# Patient Record
Sex: FEMALE | Race: WHITE | Employment: FULL TIME | URBAN - METROPOLITAN AREA
[De-identification: names, ages, dates, MRNs, and addresses within clinical notes are randomized per-mention and may not be internally consistent; named-entity substitution may affect disease eponyms.]

---

## 2017-02-10 ENCOUNTER — HOSPITAL ENCOUNTER (OUTPATIENT)
Age: 57
Discharge: HOME | End: 2017-02-10
Attending: EMERGENCY MEDICINE
Payer: COMMERCIAL

## 2017-02-10 VITALS
TEMPERATURE: 99 F | BODY MASS INDEX: 23.05 KG/M2 | WEIGHT: 135 LBS | SYSTOLIC BLOOD PRESSURE: 138 MMHG | OXYGEN SATURATION: 98 % | HEART RATE: 92 BPM | HEIGHT: 64 IN | DIASTOLIC BLOOD PRESSURE: 77 MMHG | RESPIRATION RATE: 18 BRPM

## 2017-02-10 DIAGNOSIS — N30.00 ACUTE CYSTITIS WITHOUT HEMATURIA: Primary | ICD-10-CM

## 2017-02-10 LAB
URINE BILIRUBIN: NEGATIVE
URINE BLOOD: NEGATIVE
URINE COLOR: YELLOW
URINE GLUCOSE: NEGATIVE MG/DL
URINE KETONES: NEGATIVE MG/DL
URINE NITRITE: NEGATIVE
URINE PH: 5
URINE PROTEIN: NEGATIVE MG /DL
URINE SPECIFIC GRAVITY: 1.01
URINE UROBILINOGEN: 0.2 MG/DL

## 2017-02-10 PROCEDURE — 87086 URINE CULTURE/COLONY COUNT: CPT | Performed by: EMERGENCY MEDICINE

## 2017-02-10 PROCEDURE — 99204 OFFICE O/P NEW MOD 45 MIN: CPT

## 2017-02-10 RX ORDER — CIPROFLOXACIN 500 MG/1
500 TABLET, FILM COATED ORAL 2 TIMES DAILY
Qty: 20 TABLET | Refills: 0 | Status: SHIPPED | OUTPATIENT
Start: 2017-02-10 | End: 2017-02-20

## 2017-02-10 NOTE — ED INITIAL ASSESSMENT (HPI)
PATIENT IS HERE WITH FREQUENCY IN URINATION AND BURNING WHEN SHE URINATES. SHE STATES IT HAS BEEN GOING ON FOR THE LAST FEW WEEKS.

## 2017-02-10 NOTE — ED PROVIDER NOTES
CC:urinary symptoms     HPI:     Dory Mayer is a 49year old female who presents with a chief complaint of frequency of urination with dysuria. She has had rare past episodes of UTI. Onset of symptoms was 2-3 weeks ago.      Patient reports Hx of ki daily.   Dispense:  20 tablet   Refill:  0    Labs performed this visit:    Recent Results (from the past 10 hour(s))  -[facility] POCT URINALYSIS DIPSTICK MANUAL   Collection Time: 02/10/17  3:41 PM   Result Value Ref Range   Urine Color Yellow Yellow   Urine Cl

## (undated) NOTE — ED AVS SNAPSHOT
[facility] AND [facility] in [place] [address]    Phone:  [phone]    Fax:  [phone]           Ms. Irina Finley   MRN: [id number]    Department:  [facility] AND [facility] in [facility]   Date of Vi deductible, co-payment, or co-insurance and for other services not covered under your health insurance plan. Please contact your insurance company and physician's office to determine coverage and benefits available for follow-up care and referrals.      It continue to take your medications as instructed by your Primary Care doctor until you can check with your doctor. Please bring the medication list to your next doctor's appointment.     Any imaging studies and labs completed today can be reviewed in your M and ask to get set up for an insurance coverage that is in-network with [employer] [facility]. eCollect     Sign up for eCollect, your secure online medical record.   eCollect will allow you to access patient instructions from your recent visit,  view